# Patient Record
Sex: FEMALE | Race: WHITE | NOT HISPANIC OR LATINO | URBAN - METROPOLITAN AREA
[De-identification: names, ages, dates, MRNs, and addresses within clinical notes are randomized per-mention and may not be internally consistent; named-entity substitution may affect disease eponyms.]

---

## 2021-05-10 ENCOUNTER — OFFICE VISIT (OUTPATIENT)
Dept: URBAN - METROPOLITAN AREA CLINIC 54 | Facility: CLINIC | Age: 26
End: 2021-05-10
Payer: COMMERCIAL

## 2021-05-10 DIAGNOSIS — Z90.49 HISTORY OF CHOLECYSTECTOMY: ICD-10-CM

## 2021-05-10 DIAGNOSIS — R10.13 EPIGASTRIC PAIN: ICD-10-CM

## 2021-05-10 DIAGNOSIS — R11.0 NAUSEA: ICD-10-CM

## 2021-05-10 DIAGNOSIS — R19.8 CHANGE IN BOWEL FUNCTION: ICD-10-CM

## 2021-05-10 DIAGNOSIS — F32.9 MAJOR DEPRESSIVE DISORDER, SINGLE EPISODE, UNSPECIFIED: ICD-10-CM

## 2021-05-10 DIAGNOSIS — F41.9 ANXIETY DISORDER, UNSPECIFIED: ICD-10-CM

## 2021-05-10 DIAGNOSIS — E66.9 OBESITY (BMI 30.0-34.9): ICD-10-CM

## 2021-05-10 DIAGNOSIS — R14.0 BLOATING: ICD-10-CM

## 2021-05-10 DIAGNOSIS — F12.10 MARIJUANA ABUSE: ICD-10-CM

## 2021-05-10 PROCEDURE — 99244 OFF/OP CNSLTJ NEW/EST MOD 40: CPT | Performed by: INTERNAL MEDICINE

## 2021-05-10 RX ORDER — HYOSCYAMINE SULFATE 0.12 MG/1
TABLET ORAL
Qty: 30 UNSPECIFIED | Status: ACTIVE | COMMUNITY

## 2021-05-10 RX ORDER — NORETHINDRONE ACETATE AND ETHINYL ESTRADIOL AND FERROUS FUMARATE 1MG-20(21)
KIT ORAL
Qty: 84 UNSPECIFIED | Status: ACTIVE | COMMUNITY

## 2021-05-10 RX ORDER — PROPRANOLOL HYDROCHLORIDE 10 MG/1
TABLET ORAL
Qty: 60 UNSPECIFIED | Status: ACTIVE | COMMUNITY

## 2021-05-10 RX ORDER — OMEPRAZOLE 20 MG/1
CAPSULE, DELAYED RELEASE ORAL
Qty: 180 UNSPECIFIED | Status: DISCONTINUED | COMMUNITY

## 2021-05-10 RX ORDER — ONDANSETRON 4 MG/1
TABLET, ORALLY DISINTEGRATING ORAL
Qty: 18 UNSPECIFIED | Status: ACTIVE | COMMUNITY

## 2021-05-10 RX ORDER — ESOMEPRAZOLE MAGNESIUM 20 MG/1
CAPSULE, DELAYED RELEASE ORAL
Qty: 60 UNSPECIFIED | Status: ACTIVE | COMMUNITY

## 2021-05-12 LAB
A/G RATIO: 1.6
ALBUMIN: 4.2
ALKALINE PHOSPHATASE: 90
ALT (SGPT): 11
AST (SGOT): 17
BASO (ABSOLUTE): 0.1
BASOS: 1
BILIRUBIN, TOTAL: <0.2
BUN/CREATININE RATIO: 12
BUN: 10
C-REACTIVE PROTEIN, QUANT: 9
CALCIUM: 9.3
CARBON DIOXIDE, TOTAL: 21
CHLORIDE: 105
CREATININE: 0.86
EGFR IF AFRICN AM: 109
EGFR IF NONAFRICN AM: 95
EOS (ABSOLUTE): 0.3
EOS: 4
GLOBULIN, TOTAL: 2.7
GLUCOSE: 92
HEMATOCRIT: 40.2
HEMATOLOGY COMMENTS:: (no result)
HEMOGLOBIN: 13.2
IMMATURE CELLS: (no result)
IMMATURE GRANS (ABS): 0
IMMATURE GRANULOCYTES: 0
IMMUNOGLOBULIN A, QN, SERUM: 274
LYMPHS (ABSOLUTE): 1.9
LYMPHS: 26
MCH: 29.3
MCHC: 32.8
MCV: 89
MONOCYTES(ABSOLUTE): 0.5
MONOCYTES: 7
NEUTROPHILS (ABSOLUTE): 4.5
NEUTROPHILS: 62
NRBC: (no result)
PLATELETS: 336
POTASSIUM: 5.3
PROTEIN, TOTAL: 6.9
RBC: 4.5
RDW: 12
SODIUM: 139
T-TRANSGLUTAMINASE (TTG) IGA: <2
T-TRANSGLUTAMINASE (TTG) IGG: <2
WBC: 7.3

## 2021-05-26 ENCOUNTER — TELEPHONE ENCOUNTER (OUTPATIENT)
Dept: URBAN - METROPOLITAN AREA CLINIC 92 | Facility: CLINIC | Age: 26
End: 2021-05-26

## 2022-02-21 ENCOUNTER — OFFICE VISIT (OUTPATIENT)
Dept: URBAN - METROPOLITAN AREA CLINIC 54 | Facility: CLINIC | Age: 27
End: 2022-02-21

## 2022-02-21 RX ORDER — HYOSCYAMINE SULFATE 0.12 MG/1
TABLET ORAL
Qty: 30 UNSPECIFIED | COMMUNITY

## 2022-02-21 RX ORDER — ONDANSETRON 4 MG/1
TABLET, ORALLY DISINTEGRATING ORAL
Qty: 18 UNSPECIFIED | COMMUNITY

## 2022-02-21 RX ORDER — NORETHINDRONE ACETATE AND ETHINYL ESTRADIOL AND FERROUS FUMARATE 1MG-20(21)
KIT ORAL
Qty: 84 UNSPECIFIED | COMMUNITY

## 2022-02-21 RX ORDER — PROPRANOLOL HYDROCHLORIDE 10 MG/1
TABLET ORAL
Qty: 60 UNSPECIFIED | COMMUNITY

## 2022-02-21 RX ORDER — ESOMEPRAZOLE MAGNESIUM 20 MG/1
CAPSULE, DELAYED RELEASE ORAL
Qty: 60 UNSPECIFIED | COMMUNITY

## 2022-03-08 ENCOUNTER — WEB ENCOUNTER (OUTPATIENT)
Dept: URBAN - METROPOLITAN AREA CLINIC 54 | Facility: CLINIC | Age: 27
End: 2022-03-08

## 2022-03-09 ENCOUNTER — OFFICE VISIT (OUTPATIENT)
Dept: URBAN - METROPOLITAN AREA CLINIC 54 | Facility: CLINIC | Age: 27
End: 2022-03-09
Payer: COMMERCIAL

## 2022-03-09 ENCOUNTER — WEB ENCOUNTER (OUTPATIENT)
Dept: URBAN - METROPOLITAN AREA CLINIC 54 | Facility: CLINIC | Age: 27
End: 2022-03-09

## 2022-03-09 DIAGNOSIS — R11.2 NAUSEA AND VOMITING, UNSPECIFIED VOMITING TYPE: ICD-10-CM

## 2022-03-09 DIAGNOSIS — Z90.49 HISTORY OF CHOLECYSTECTOMY: ICD-10-CM

## 2022-03-09 DIAGNOSIS — F12.10 MARIJUANA ABUSE: ICD-10-CM

## 2022-03-09 DIAGNOSIS — F41.9 ANXIETY DISORDER, UNSPECIFIED: ICD-10-CM

## 2022-03-09 DIAGNOSIS — E66.9 OBESITY (BMI 30.0-34.9): ICD-10-CM

## 2022-03-09 DIAGNOSIS — R19.8 CHANGE IN BOWEL FUNCTION: ICD-10-CM

## 2022-03-09 DIAGNOSIS — F32.9 MAJOR DEPRESSIVE DISORDER, SINGLE EPISODE, UNSPECIFIED: ICD-10-CM

## 2022-03-09 DIAGNOSIS — R10.13 EPIGASTRIC PAIN: ICD-10-CM

## 2022-03-09 PROCEDURE — 99213 OFFICE O/P EST LOW 20 MIN: CPT

## 2022-03-09 RX ORDER — METOCLOPRAMIDE HYDROCHLORIDE 10 MG/1
1 TABLET BEFORE MEALS TABLET ORAL TWICE A DAY
Status: ACTIVE | COMMUNITY

## 2022-03-09 RX ORDER — HYOSCYAMINE SULFATE 0.12 MG/1
TABLET ORAL
OUTPATIENT

## 2022-03-09 RX ORDER — DICYCLOMINE HYDROCHLORIDE 20 MG/1
1 TABLET TABLET ORAL THREE TIMES A DAY
Qty: 90 | Refills: 3 | OUTPATIENT

## 2022-03-09 RX ORDER — HYDROXYZINE HYDROCHLORIDE 10 MG/1
AS DIRECTED TABLET, FILM COATED ORAL
Status: ACTIVE | COMMUNITY

## 2022-03-09 RX ORDER — ESOMEPRAZOLE MAGNESIUM 20 MG/1
1 CAPSULE CAPSULE, DELAYED RELEASE ORAL TWICE A DAY
Qty: 60 | Refills: 3 | OUTPATIENT
Start: 2022-03-09

## 2022-03-09 RX ORDER — OMEPRAZOLE 20 MG/1
1 CAPSULE 30 MINUTES BEFORE MORNING MEAL CAPSULE, DELAYED RELEASE ORAL TWICE A DAY
Status: ACTIVE | COMMUNITY

## 2022-03-09 RX ORDER — ONDANSETRON 4 MG/1
TABLET, ORALLY DISINTEGRATING ORAL
Qty: 18 UNSPECIFIED | Status: ACTIVE | COMMUNITY

## 2022-03-09 RX ORDER — HYOSCYAMINE SULFATE 0.12 MG/1
TABLET ORAL
Qty: 30 UNSPECIFIED | Status: ACTIVE | COMMUNITY

## 2022-03-09 RX ORDER — PROMETHAZINE HYDROCHLORIDE 25 MG/1
1 TABLET AS NEEDED TABLET ORAL
Status: ACTIVE | COMMUNITY

## 2022-03-09 RX ORDER — PROPRANOLOL HYDROCHLORIDE 10 MG/1
TABLET ORAL
Qty: 60 UNSPECIFIED | Status: ACTIVE | COMMUNITY

## 2022-03-09 RX ORDER — NORETHINDRONE ACETATE AND ETHINYL ESTRADIOL AND FERROUS FUMARATE 1MG-20(21)
KIT ORAL
Qty: 84 UNSPECIFIED | Status: ACTIVE | COMMUNITY

## 2022-03-09 RX ORDER — ESOMEPRAZOLE MAGNESIUM 20 MG/1
CAPSULE, DELAYED RELEASE ORAL
Qty: 60 UNSPECIFIED | Status: DISCONTINUED | COMMUNITY

## 2022-03-09 NOTE — HPI-TODAY'S VISIT:
5/10/21: Patient is a 26 yo F referred by Dr. Syed Willis MD for above reason. A copy of this document will be sent to referring provider. She carries a diagnosis of GERD x 3 years. She has managed her symptoms with Omeprazole and recently switched to Nexium 40 mg po daily. She c/o abdominal pain x 2 years. She was diagnosed with biliary dyskinesia and had a lap CC in June 2020. She was mostly symptom free 6 months post surgery but admits to intermittent diarrhea. However, since Jan 2021 she has developed severe epigastric pain 10/10 leading to 2 ER visits. The pain is non-radiating and can last for hours. This is associated with nausea. Tylenol does not help. No fever, chills or night sweats. She has had nocturnal awakenings. There is no clear relationship to food intake. She has modified her diet by eating bland foods with mild improvement. Her bowel movements now fluctuate between diarrhea and constipation. She has significant bloating and distention. No known food allergies or intolerances. She apparently had an unremarkable EGD 2 weeks ago with negative gastric biopsies. No small bowel biopsies were taken. She reports negative celiac testing by her PCP. She has never had a colonoscopy. Her nephew has Crohn's disease. She smokes MJ on daily basis for several years.   3/9/22: Pt is a 25 yo female with PMH of GERD on bid omeprazole who presents for evaluation of epigastric pain, similar to what she initially presented with last visit. Pt reports "attacks" of epigastric pain radiating to the right side about 3 times a week for the last 3 months. Pain slowly worsens for a few hours to a few days then improves. Pt also has loss of appetite with morning nausea until mid afternoon. Vomits at least once a day. Reports early satiety with eating. Takes Reglan and Levsin bid with some relief, as well as omeprazole bid, Pepto bismol, and GasX. Pt has been evaluated by GYN and is not pregnant. Pt is also has alternating BMs between diarrhea and constipation. Pt tried low FODMAP diet without much relief. No hematochezia or melena, but pt admits to unintentional weight loss of 15 lbs since start of this year. Pt does smoke marijuana occasionally. Pt has never had cscope. Recent labs and imaging from visit to [facility] ED on 1/5/22 for the same issue have been reviewed. Unremarkable labs including CBC, CMP, serum pregnancy test. CT abd/pelvis with contrast with no acute findings.  EGD biopsy 4/23/21 : Mild chronic gastritis. Negative for H pylori, dysplasia, or malignancy.   GES 5/20/21: Prolonged lag phase which is nonspecific but can be seen in cases of early diabetic gastroparesis. Clinical correlation is recommended. Otherwise normal range solid phase gastric half-emptying time.

## 2022-03-09 NOTE — PHYSICAL EXAM GASTROINTESTINAL
Abdomen , soft, mild epigastric and RUQ tenderness, nondistended , no guarding or rigidity , no masses palpable , normal bowel sounds , Liver and Spleen , no hepatomegaly present , no hepatosplenomegaly , liver nontender , spleen not palpable

## 2022-03-11 ENCOUNTER — OFFICE VISIT (OUTPATIENT)
Dept: RURAL CLINIC 2 | Facility: CLINIC | Age: 27
End: 2022-03-11
Payer: COMMERCIAL

## 2022-03-11 DIAGNOSIS — R19.7 DIARRHEA, UNSPECIFIED TYPE: ICD-10-CM

## 2022-03-11 DIAGNOSIS — K21.9 GERD WITHOUT ESOPHAGITIS: ICD-10-CM

## 2022-03-11 DIAGNOSIS — R11.14 BILIOUS VOMITING WITH NAUSEA: ICD-10-CM

## 2022-03-11 DIAGNOSIS — R10.13 EPIGASTRIC ABDOMINAL PAIN: ICD-10-CM

## 2022-03-11 DIAGNOSIS — R68.81 EARLY SATIETY: ICD-10-CM

## 2022-03-11 DIAGNOSIS — Z90.49 HISTORY OF CHOLECYSTECTOMY: ICD-10-CM

## 2022-03-11 DIAGNOSIS — R63.0 LOSS OF APPETITE: ICD-10-CM

## 2022-03-11 DIAGNOSIS — R19.5 LOOSE STOOLS: ICD-10-CM

## 2022-03-11 DIAGNOSIS — R14.3 FLATULENCE: ICD-10-CM

## 2022-03-11 DIAGNOSIS — R19.4 CHANGE IN BOWEL HABITS: ICD-10-CM

## 2022-03-11 PROBLEM — 266435005: Status: ACTIVE | Noted: 2022-03-11

## 2022-03-11 PROBLEM — 79890006: Status: ACTIVE | Noted: 2022-03-11

## 2022-03-11 PROBLEM — 428882003: Status: ACTIVE | Noted: 2021-05-10

## 2022-03-11 PROCEDURE — 99214 OFFICE O/P EST MOD 30 MIN: CPT | Performed by: INTERNAL MEDICINE

## 2022-03-11 RX ORDER — DICYCLOMINE HYDROCHLORIDE 20 MG/1
1 TABLET TABLET ORAL THREE TIMES A DAY
Qty: 90 | Refills: 3 | Status: ACTIVE | COMMUNITY

## 2022-03-11 RX ORDER — ESOMEPRAZOLE MAGNESIUM 20 MG/1
1 CAPSULE CAPSULE, DELAYED RELEASE ORAL TWICE A DAY
Qty: 60 | Refills: 3 | Status: ACTIVE | COMMUNITY
Start: 2022-03-09

## 2022-03-11 RX ORDER — ONDANSETRON 4 MG/1
TABLET, ORALLY DISINTEGRATING ORAL
Qty: 18 UNSPECIFIED | Status: ACTIVE | COMMUNITY

## 2022-03-11 RX ORDER — HYDROXYZINE HYDROCHLORIDE 10 MG/1
AS DIRECTED TABLET, FILM COATED ORAL
Status: ACTIVE | COMMUNITY

## 2022-03-11 RX ORDER — PROMETHAZINE HYDROCHLORIDE 25 MG/1
1 TABLET AS NEEDED TABLET ORAL
Status: ACTIVE | COMMUNITY

## 2022-03-11 RX ORDER — PROPRANOLOL HYDROCHLORIDE 10 MG/1
TABLET ORAL
Qty: 60 UNSPECIFIED | Status: ACTIVE | COMMUNITY

## 2022-03-11 RX ORDER — NORETHINDRONE ACETATE AND ETHINYL ESTRADIOL AND FERROUS FUMARATE 1MG-20(21)
KIT ORAL
Qty: 84 UNSPECIFIED | Status: ACTIVE | COMMUNITY

## 2022-03-11 RX ORDER — OMEPRAZOLE 20 MG/1
1 CAPSULE 30 MINUTES BEFORE MORNING MEAL CAPSULE, DELAYED RELEASE ORAL TWICE A DAY
Status: ACTIVE | COMMUNITY

## 2022-03-11 RX ORDER — METOCLOPRAMIDE HYDROCHLORIDE 10 MG/1
1 TABLET BEFORE MEALS TABLET ORAL TWICE A DAY
Status: ACTIVE | COMMUNITY

## 2022-03-11 NOTE — HPI-TODAY'S VISIT:
5/10/21: Patient is a 26 yo F referred by Dr. Syed Willis MD for above reason. A copy of this document will be sent to referring provider. She carries a diagnosis of GERD x 3 years. She has managed her symptoms with Omeprazole and recently switched to Nexium 40 mg po daily. She c/o abdominal pain x 2 years. She was diagnosed with biliary dyskinesia and had a lap CC in June 2020. She was mostly symptom free 6 months post surgery but admits to intermittent diarrhea. However, since Jan 2021 she has developed severe epigastric pain 10/10 leading to 2 ER visits. The pain is non-radiating and can last for hours. This is associated with nausea. Tylenol does not help. No fever, chills or night sweats. She has had nocturnal awakenings. There is no clear relationship to food intake. She has modified her diet by eating bland foods with mild improvement. Her bowel movements now fluctuate between diarrhea and constipation. She has significant bloating and distention. No known food allergies or intolerances. She apparently had an unremarkable EGD 2 weeks ago with negative gastric biopsies. No small bowel biopsies were taken. She reports negative celiac testing by her PCP. She has never had a colonoscopy. Her nephew has Crohn's disease. She smokes MJ on daily basis for several years.   3/9/22: Pt is a 25 yo female with PMH of GERD on bid omeprazole who presents for evaluation of epigastric pain, similar to what she initially presented with last visit. Pt reports "attacks" of epigastric pain radiating to the right side about 3 times a week for the last 3 months. Pain slowly worsens for a few hours to a few days then improves. Pt also has loss of appetite with morning nausea until mid afternoon. Vomits at least once a day. Reports early satiety with eating. Takes Reglan and Levsin bid with some relief, as well as omeprazole bid, Pepto bismol, and GasX. Pt has been evaluated by GYN and is not pregnant. Pt is also has alternating BMs between diarrhea and constipation. Pt tried low FODMAP diet without much relief. No hematochezia or melena, but pt admits to unintentional weight loss of 15 lbs since start of this year. Pt does smoke marijuana occasionally. Pt has never had cscope. Recent labs and imaging from visit to [facility] ED on 1/5/22 for the same issue have been reviewed. Unremarkable labs including CBC, CMP, serum pregnancy test. CT abd/pelvis with contrast with no acute findings.  EGD biopsy 4/23/21 : Mild chronic gastritis. Negative for H pylori, dysplasia, or malignancy.   GES 5/20/21: Prolonged lag phase which is nonspecific but can be seen in cases of early diabetic gastroparesis. Clinical correlation is recommended. Otherwise normal range solid phase gastric half-emptying time. - 3/11/2022: she has been having issues with her stomach since 2020. had lap jacque back then and was okay. last year around october she would start getting attacks. come on early in the morning. will have a stomach ache till she cannot move and is doubled over. it comes in waves. she has nausea and vomiting daily for the last three weeks.  hurts to eat and hurts not to eat. lots of bloating and gas. - CT in [facility] ER 1/2022 was normal as was blood work. she says that her stools are sometimes yellow or sometimes rust color. having the mushy stools. will come out as sludge. on nexium BID and reglan BID

## 2022-03-18 ENCOUNTER — OFFICE VISIT (OUTPATIENT)
Dept: URBAN - METROPOLITAN AREA CLINIC 54 | Facility: CLINIC | Age: 27
End: 2022-03-18

## 2022-04-15 PROBLEM — 231504006: Status: ACTIVE | Noted: 2021-05-10

## 2022-04-15 PROBLEM — 37344009: Status: ACTIVE | Noted: 2021-05-10

## 2022-04-15 PROBLEM — 36923009: Status: ACTIVE | Noted: 2021-05-10

## 2022-04-15 PROBLEM — 443371000124107: Status: ACTIVE | Noted: 2021-05-10

## 2022-04-28 ENCOUNTER — CLAIMS CREATED FROM THE CLAIM WINDOW (OUTPATIENT)
Dept: RURAL MEDICAL CENTER 4 | Facility: MEDICAL CENTER | Age: 27
End: 2022-04-28
Payer: COMMERCIAL

## 2022-04-28 DIAGNOSIS — K29.60 ADENOPAPILLOMATOSIS GASTRICA: ICD-10-CM

## 2022-04-28 DIAGNOSIS — R11.2 ACUTE NAUSEA WITH NONBILIOUS VOMITING: ICD-10-CM

## 2022-04-28 DIAGNOSIS — R19.7 ACUTE DIARRHEA: ICD-10-CM

## 2022-04-28 PROCEDURE — 43239 EGD BIOPSY SINGLE/MULTIPLE: CPT | Performed by: INTERNAL MEDICINE

## 2022-04-28 RX ORDER — ESOMEPRAZOLE MAGNESIUM 20 MG/1
1 CAPSULE CAPSULE, DELAYED RELEASE ORAL TWICE A DAY
Qty: 60 | Refills: 3 | Status: ACTIVE | COMMUNITY
Start: 2022-03-09

## 2022-04-28 RX ORDER — PROMETHAZINE HYDROCHLORIDE 25 MG/1
1 TABLET AS NEEDED TABLET ORAL
Status: ACTIVE | COMMUNITY

## 2022-04-28 RX ORDER — OMEPRAZOLE 20 MG/1
1 CAPSULE 30 MINUTES BEFORE MORNING MEAL CAPSULE, DELAYED RELEASE ORAL TWICE A DAY
Status: ACTIVE | COMMUNITY

## 2022-04-28 RX ORDER — PROPRANOLOL HYDROCHLORIDE 10 MG/1
TABLET ORAL
Qty: 60 UNSPECIFIED | Status: ACTIVE | COMMUNITY

## 2022-04-28 RX ORDER — HYDROXYZINE HYDROCHLORIDE 10 MG/1
AS DIRECTED TABLET, FILM COATED ORAL
Status: ACTIVE | COMMUNITY

## 2022-04-28 RX ORDER — NORETHINDRONE ACETATE AND ETHINYL ESTRADIOL AND FERROUS FUMARATE 1MG-20(21)
KIT ORAL
Qty: 84 UNSPECIFIED | Status: ACTIVE | COMMUNITY

## 2022-04-28 RX ORDER — METOCLOPRAMIDE HYDROCHLORIDE 10 MG/1
1 TABLET BEFORE MEALS TABLET ORAL TWICE A DAY
Status: ACTIVE | COMMUNITY

## 2022-04-28 RX ORDER — DICYCLOMINE HYDROCHLORIDE 20 MG/1
1 TABLET TABLET ORAL THREE TIMES A DAY
Qty: 90 | Refills: 3 | Status: ACTIVE | COMMUNITY

## 2022-04-28 RX ORDER — ONDANSETRON 4 MG/1
TABLET, ORALLY DISINTEGRATING ORAL
Qty: 18 UNSPECIFIED | Status: ACTIVE | COMMUNITY

## 2022-06-06 ENCOUNTER — DASHBOARD ENCOUNTERS (OUTPATIENT)
Age: 27
End: 2022-06-06

## 2022-06-09 ENCOUNTER — OFFICE VISIT (OUTPATIENT)
Dept: URBAN - METROPOLITAN AREA CLINIC 54 | Facility: CLINIC | Age: 27
End: 2022-06-09

## 2022-06-09 RX ORDER — ONDANSETRON 4 MG/1
TABLET, ORALLY DISINTEGRATING ORAL
Qty: 18 UNSPECIFIED | COMMUNITY

## 2022-06-09 RX ORDER — PROPRANOLOL HYDROCHLORIDE 10 MG/1
TABLET ORAL
Qty: 60 UNSPECIFIED | COMMUNITY

## 2022-06-09 RX ORDER — PROMETHAZINE HYDROCHLORIDE 25 MG/1
1 TABLET AS NEEDED TABLET ORAL
COMMUNITY

## 2022-06-09 RX ORDER — NORETHINDRONE ACETATE AND ETHINYL ESTRADIOL AND FERROUS FUMARATE 1MG-20(21)
KIT ORAL
Qty: 84 UNSPECIFIED | COMMUNITY

## 2022-06-09 RX ORDER — OMEPRAZOLE 20 MG/1
1 CAPSULE 30 MINUTES BEFORE MORNING MEAL CAPSULE, DELAYED RELEASE ORAL TWICE A DAY
COMMUNITY

## 2022-06-09 RX ORDER — DICYCLOMINE HYDROCHLORIDE 20 MG/1
1 TABLET TABLET ORAL THREE TIMES A DAY
Qty: 90 | Refills: 3 | COMMUNITY

## 2022-06-09 RX ORDER — HYDROXYZINE HYDROCHLORIDE 10 MG/1
AS DIRECTED TABLET, FILM COATED ORAL
COMMUNITY

## 2022-06-09 RX ORDER — METOCLOPRAMIDE HYDROCHLORIDE 10 MG/1
1 TABLET BEFORE MEALS TABLET ORAL TWICE A DAY
COMMUNITY

## 2022-06-09 RX ORDER — ESOMEPRAZOLE MAGNESIUM 20 MG/1
1 CAPSULE CAPSULE, DELAYED RELEASE ORAL TWICE A DAY
Qty: 60 | Refills: 3 | COMMUNITY
Start: 2022-03-09

## 2022-07-05 ENCOUNTER — ERX REFILL RESPONSE (OUTPATIENT)
Dept: URBAN - METROPOLITAN AREA CLINIC 54 | Facility: CLINIC | Age: 27
End: 2022-07-05

## 2022-07-05 RX ORDER — DICYCLOMINE HYDROCHLORIDE 20 MG/1
1 TABLET TABLET ORAL THREE TIMES A DAY
Qty: 90 | Refills: 3 | OUTPATIENT

## 2022-07-05 RX ORDER — DICYCLOMINE HYDROCHLORIDE 20 MG/1
TAKE 1 TABLET BY MOUTH THREE TIMES A DAY FOR 30 DAYS TABLET ORAL
Qty: 270 TABLET | Refills: 1 | OUTPATIENT